# Patient Record
Sex: MALE | Race: WHITE | NOT HISPANIC OR LATINO | URBAN - METROPOLITAN AREA
[De-identification: names, ages, dates, MRNs, and addresses within clinical notes are randomized per-mention and may not be internally consistent; named-entity substitution may affect disease eponyms.]

---

## 2019-07-31 ENCOUNTER — TRANSFERRED RECORDS (OUTPATIENT)
Dept: HEALTH INFORMATION MANAGEMENT | Facility: CLINIC | Age: 59
End: 2019-07-31

## 2019-09-04 ENCOUNTER — OFFICE VISIT (OUTPATIENT)
Dept: OPHTHALMOLOGY | Facility: CLINIC | Age: 59
End: 2019-09-04
Attending: OPHTHALMOLOGY
Payer: COMMERCIAL

## 2019-09-04 DIAGNOSIS — H53.10 SUBJECTIVE VISUAL DISTURBANCE: Primary | ICD-10-CM

## 2019-09-04 DIAGNOSIS — H47.20 PARTIAL OPTIC ATROPHY: ICD-10-CM

## 2019-09-04 PROCEDURE — 92133 CPTRZD OPH DX IMG PST SGM ON: CPT | Mod: ZF | Performed by: OPHTHALMOLOGY

## 2019-09-04 PROCEDURE — 92083 EXTENDED VISUAL FIELD XM: CPT | Mod: ZF | Performed by: OPHTHALMOLOGY

## 2019-09-04 PROCEDURE — G0463 HOSPITAL OUTPT CLINIC VISIT: HCPCS | Mod: ZF | Performed by: TECHNICIAN/TECHNOLOGIST

## 2019-09-04 RX ORDER — MOMETASONE FUROATE MONOHYDRATE 50 UG/1
2 SPRAY, METERED NASAL PRN
COMMUNITY

## 2019-09-04 ASSESSMENT — REFRACTION_WEARINGRX
OD_ADD: +2.50
OD_CYLINDER: SPHERE
OD_SPHERE: +0.25
OS_CYLINDER: +1.25
OS_AXIS: 065
OS_ADD: +2.50
OS_SPHERE: -1.25
SPECS_TYPE: PAL

## 2019-09-04 ASSESSMENT — EXTERNAL EXAM - LEFT EYE: OS_EXAM: NORMAL

## 2019-09-04 ASSESSMENT — CUP TO DISC RATIO
OD_RATIO: 0.0
OS_RATIO: 0.0

## 2019-09-04 ASSESSMENT — TONOMETRY
IOP_METHOD: ICARE
OD_IOP_MMHG: 07
OS_IOP_MMHG: 08

## 2019-09-04 ASSESSMENT — VISUAL ACUITY
CORRECTION_TYPE: GLASSES
METHOD: SNELLEN - LINEAR
OS_CC: 20/400
OD_CC: 20/20-1

## 2019-09-04 ASSESSMENT — EXTERNAL EXAM - RIGHT EYE: OD_EXAM: NORMAL

## 2019-09-04 ASSESSMENT — SLIT LAMP EXAM - LIDS
COMMENTS: NORMAL
COMMENTS: NORMAL

## 2019-09-04 ASSESSMENT — CONF VISUAL FIELD
OD_INFERIOR_NASAL_RESTRICTION: 3
METHOD: COUNTING FINGERS
OS_INFERIOR_NASAL_RESTRICTION: 3
OD_SUPERIOR_NASAL_RESTRICTION: 3

## 2019-09-04 NOTE — LETTER
"2019    RE: Bruno Britt  : 1960  MRN: [id number]    Dear Dr. Welch,    Thank you for referring your patient, Bruno Britt, to my neuro-ophthalmology clinic recently.  After a thorough neuro-ophthalmic history and examination, I came to the following conclusions:      1. 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  Will institute work-up including reviewing old MRI myself, obtaining new MRI (regardless of findings of old MRI given history of progression since last \"normal\" scan), labs for nutritional optic neuropathy and syphilis.  Will also check full field electroretinogram given his history of sensorineural hearing loss given that retinitis pigmentosa without pigment can present with hearing loss as part of Usher syndrome though he is on the older side to be presenting for this.  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  Follow-up with me in 2.5 months or sooner as needed.    2. Small optic nerve head drusen in the right eye- not the etiology for bilateral optic atrophy.  Incidental to the optic atrophy in this case as there would have to be extensive drusen burden to cause this degree of optic atrophy.    Bruno Britt is a pleasant 59 year old White male who presents to my neuro-ophthalmology clinic today     Patient feels like he has had a decline in his left eye progressive over the last 3 years.  In the right eye he also feels like more recently he has had some deterioration in vision.  At no point as he ever remember sudden acute onset of vision loss.  He denies kenton eye pain.  His left eye has always been poor compared to his right eye.    The patient underwent an MRI 3 years ago but he cannot recall exactly where this was performed.  He believes it was somewhere in [place].  We are " currently trying to track down his old MRI (addendum: found scan at [facility] radiology).  He was told that this was normal without any evidence for why his vision was blurry.      No history of head trauma.    Patient has difficulty with night vision.  Difficulty getting to bathroom.    Past medical history: patient has bad hay fever in certain seasons.  He takes no medications except as needed allergy medications.  Patient had bad eczema has a child.    Patient has longstanding hearing loss.  Had difficulty with school performance / learning.    Job: patient works on maintenance and snow removal.  Patient reports normal diet and no history of etoh abuse.  Bowel habits normal.  No constant paresthesias sometimes right leg goes numb when driving for hours (sitting on wallet).     Family history: Maternal great grandfather was blind but unclear why.  Brother has glaucoma managed with drops.     Note from 09/2017 audiology showed what appears to be mild bilateral sensorineural hearing loss.    Exam today shows 20/20 vision in the right eye with 11/11 Ishihara color plates.  He saw 20/400 in the left eye and no Ishihara color plates.  Slit lamp exam was unremarkable bilaterally.   Dilated fundus exam showed moderate diffuse pallor in the right eye with one surface drusen and in the left eye moderate to severe diffuse optic atrophy / pallor.  Cranial nerves V1-3, 7,8,9,11, and 12 were normal bilaterally.     OCT of the optic nerve head revealed diffuse severe retinal nerve fiber layer thinning in both eyes without a clear pattern of atrophy.    Octopus automated 30 degree visual field was unreliable in both eyes and showed nonspecific depression in the right eye and global depression in the left eye.    In conclusion we have bilateral significant optic atrophy without a clear etiology and gradual progressive vision loss.  I am certainly concerned for compressive optic neuropathy from a benign mass although he reportedly had  an unremarkable MRI 2-3 years ago (we are currently trying to obtain images).  Will check MRI brain and orbits with and without IV contrast regardless of old MRI given his progression in vision loss since prior MRI.  Will check labs for nutritional optic neuropathy and syphilis though I expect these will be normal.  Will check full field electroretinogram simply because of his hearing loss and a visual field pattern more indicative of constriction than central field loss (particularly in the right eye).  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  We can discuss the pros and cons of genetic testing in further detail at next visit.  Unfortunately there is no effective treatment for dominant optic atrophy.      Again, thank you for trusting me with the care of your patient.  For further exam details, please feel free to contact our office for additional records.  If you wish to contact me regarding this patient please email me at [email or web address] or give my clinic a call to arrange a phone conversation.    Sincerely,    Nilton Arboleda MD  , Neuro-Ophthalmology and Adult Strabismus Surgery  The [facility] D[facility] and [facility]il[facility] Chair in Neuro-Ophthalmology  Department of Ophthalmology and Visual Neurosciences  [facility]    DX: optic atrophy, hearing loss

## 2019-09-04 NOTE — PROGRESS NOTES
"   1. 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  Will institute work-up including reviewing old MRI myself, obtaining new MRI (regardless of findings of old MRI given history of progression since last \"normal\" scan), labs for nutritional optic neuropathy and syphilis.  Will also check full field electroretinogram given his history of sensorineural hearing loss given that retinitis pigmentosa without pigment can present with hearing loss as part of Usher syndrome though he is on the older side to be presenting for this.  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  Follow-up with me in 2.5 months or sooner as needed.    2. Small optic nerve head drusen in the right eye- not the etiology for bilateral optic atrophy.  Incidental to the optic atrophy in this case as there would have to be extensive drusen burden to cause this degree of optic atrophy.    Bruno Britt is a pleasant 59 year old White male who presents to my neuro-ophthalmology clinic today     Patient feels like he has had a decline in his left eye progressive over the last 3 years.  In the right eye he also feels like more recently he has had some deterioration in vision.  At no point as he ever remember sudden acute onset of vision loss.  He denies kenton eye pain.  His left eye has always been poor compared to his right eye.    The patient underwent an MRI 3 years ago but he cannot recall exactly where this was performed.  He believes it was somewhere in [place].  We are currently trying to track down his old MRI (addendum: found scan at [facility] radiology).  He was told that this was normal without any evidence for why his vision was blurry.      No history of head trauma.    Patient has difficulty with night vision.  Difficulty getting to bathroom.    Past medical " history: patient has bad hay fever in certain seasons.  He takes no medications except as needed allergy medications.  Patient had bad eczema has a child.    Patient has longstanding hearing loss.  Had difficulty with school performance / learning.    Job: patient works on maintenance and snow removal.  Patient reports normal diet and no history of etoh abuse.  Bowel habits normal.  No constant paresthesias sometimes right leg goes numb when driving for hours (sitting on wallet).     Family history: Maternal great grandfather was blind but unclear why.  Brother has glaucoma managed with drops.     Note from 09/2017 audiology showed what appears to be mild bilateral sensorineural hearing loss.    Exam today shows 20/20 vision in the right eye with 11/11 Ishihara color plates.  He saw 20/400 in the left eye and no Ishihara color plates.  Slit lamp exam was unremarkable bilaterally.   Dilated fundus exam showed moderate diffuse pallor in the right eye with one surface drusen and in the left eye moderate to severe diffuse optic atrophy / pallor.  Cranial nerves V1-3, 7,8,9,11, and 12 were normal bilaterally.     OCT of the optic nerve head revealed diffuse severe retinal nerve fiber layer thinning in both eyes without a clear pattern of atrophy.    Octopus automated 30 degree visual field was unreliable in both eyes and showed nonspecific depression in the right eye and global depression in the left eye.    In conclusion we have bilateral significant optic atrophy without a clear etiology and gradual progressive vision loss.  I am certainly concerned for compressive optic neuropathy from a benign mass although he reportedly had an unremarkable MRI 2-3 years ago (we are currently trying to obtain images).  Will check MRI brain and orbits with and without IV contrast regardless of old MRI given his progression in vision loss since prior MRI.  Will check labs for nutritional optic neuropathy and syphilis though I expect  these will be normal.  Will check full field electroretinogram simply because of his hearing loss and a visual field pattern more indicative of constriction than central field loss (particularly in the right eye).  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  We can discuss the pros and cons of genetic testing in further detail at next visit.  Unfortunately there is no effective treatment for dominant optic atrophy.         I spent a total of 60 minutes face to face with Bruno Onealmike during today's office visit.  Over 50% of this time was spent counseling the patient and/or coordinating care regarding his optic atrophy.    Complete documentation of historical and exam elements from today's encounter can be found in the full encounter summary report (not reduplicated in this progress note).  I personally obtained the chief complaint(s) and history of present illness.  I confirmed and edited as necessary the review of systems, past medical/surgical history, family history, social history, and examination findings as documented by others; and I examined the patient myself.  I personally reviewed the relevant tests, images, and reports as documented above.  I formulated and edited as necessary the assessment and plan and discussed the findings and management plan with the patient and family.  I personally reviewed the ophthalmic test(s) associated with this encounter, agree with the interpretation(s) as documented by the resident/fellow, and have edited the corresponding report(s) as necessary.     Nilton Arboleda MD

## 2019-09-05 ENCOUNTER — TELEPHONE (OUTPATIENT)
Dept: OPHTHALMOLOGY | Facility: CLINIC | Age: 59
End: 2019-09-05

## 2019-09-06 NOTE — TELEPHONE ENCOUNTER
great.  just got off phone with brother of patient and went through the relevance of the old records.    Everyone is on the same page .    On Thu, Sep 5, 2019 at 3:17 PM Sagar Bowens <[email or web address]> wrote:  This message was sent securely using Z[employer]       The 9- ffERG has been canceled in EPIC!     From: Nilton Arboleda [mailto:[email or web address]]   Sent: Thursday, September 05, 2019 3:12 PM  To: Maria C Ortega; Sagar Bowens  Subject: Fwd: Fw: A fax has arrived from remote ID ''.     Oops- left Sagar Loving off first email.  ---------- Forwarded message ---------  From: Nilton Arboleda <[email or web address]>  Date: Thu, Sep 5, 2019 at 3:11 PM  Subject: Re: Fw: A fax has arrived from remote ID ''.  To: Maria C Ortega <[email or web address]>     thank you!     ASHLIE- Sagar Loving and Meryl, this documentation helps me a lot with this patients vision loss.  The patient left A LOT of the relevant history out when he saw me.  I don't believe the ERG is required anymore.  Sagar Loving, I will call the patient's brother and patient and tell them that I will cancel.  Can you go ahead and cancel it formally?     -Nilton     On Thu, Sep 5, 2019 at 1:33 PM Maria C Ortega <[email or web address]> wrote:  This message was sent securely using [employer]        Records you wanted for this pt     Bruno Britt  Male, 59 yrs, 1960  MRN:   [id number]     sent for scanning     Maria C Ortega  Neuro-Ophthalmology Clinical Facilitator  [address], [place] [address]  Phone: [phone]  Fax: [phone]

## 2019-09-09 ENCOUNTER — HOSPITAL ENCOUNTER (OUTPATIENT)
Dept: MRI IMAGING | Facility: CLINIC | Age: 59
Discharge: HOME | End: 2019-09-09
Attending: OPHTHALMOLOGY | Admitting: OPHTHALMOLOGY
Payer: COMMERCIAL

## 2019-09-09 ENCOUNTER — HOSPITAL ENCOUNTER (OUTPATIENT)
Dept: MRI IMAGING | Facility: CLINIC | Age: 59
End: 2019-09-09
Attending: OPHTHALMOLOGY
Payer: COMMERCIAL

## 2019-09-09 DIAGNOSIS — H53.10 SUBJECTIVE VISUAL DISTURBANCE: ICD-10-CM

## 2019-09-09 PROCEDURE — A9585 GADOBUTROL INJECTION: HCPCS | Performed by: OPHTHALMOLOGY

## 2019-09-09 PROCEDURE — 25500064 ZZH RX 255 OP 636: Performed by: OPHTHALMOLOGY

## 2019-09-09 PROCEDURE — 70553 MRI BRAIN STEM W/O & W/DYE: CPT

## 2019-09-09 PROCEDURE — 70543 MRI ORBT/FAC/NCK W/O &W/DYE: CPT

## 2019-09-09 RX ORDER — GADOBUTROL 604.72 MG/ML
8 INJECTION INTRAVENOUS ONCE
Status: COMPLETED | OUTPATIENT
Start: 2019-09-09 | End: 2019-09-09

## 2019-09-09 RX ADMIN — GADOBUTROL 8 ML: 604.72 INJECTION INTRAVENOUS at 14:18

## 2019-09-10 ENCOUNTER — MYC MEDICAL ADVICE (OUTPATIENT)
Dept: OTHER | Age: 59
End: 2019-09-10

## 2019-09-10 NOTE — TELEPHONE ENCOUNTER
09/10/2019 10:14 AM Phone (Outgoing) Bruno Britt (Self) [phone] (H) Remove   Left Message - Left message with MRI results         -em 10:15 AM September 10, 2019

## 2019-09-10 NOTE — TELEPHONE ENCOUNTER
Nilton Arboleda MD Mor[place], Eleanor             Please call patient and let him know that his MRI came back essentially normal without any evidence for a serious cause of vision loss. No tumors, no cancer, no inflammation.  This is a good thing.  Tell him we will discuss more at his follow-up visit with me.     Thank you. - Nilton

## 2019-10-04 ENCOUNTER — HEALTH MAINTENANCE LETTER (OUTPATIENT)
Age: 59
End: 2019-10-04

## 2019-11-13 ENCOUNTER — OFFICE VISIT (OUTPATIENT)
Dept: OPHTHALMOLOGY | Facility: CLINIC | Age: 59
End: 2019-11-13
Attending: OPHTHALMOLOGY
Payer: COMMERCIAL

## 2019-11-13 DIAGNOSIS — H47.20 PARTIAL OPTIC ATROPHY: ICD-10-CM

## 2019-11-13 DIAGNOSIS — H53.10 SUBJECTIVE VISUAL DISTURBANCE: Primary | ICD-10-CM

## 2019-11-13 PROCEDURE — 92133 CPTRZD OPH DX IMG PST SGM ON: CPT | Mod: ZF | Performed by: OPHTHALMOLOGY

## 2019-11-13 PROCEDURE — 92083 EXTENDED VISUAL FIELD XM: CPT | Mod: ZF | Performed by: OPHTHALMOLOGY

## 2019-11-13 PROCEDURE — G0463 HOSPITAL OUTPT CLINIC VISIT: HCPCS | Mod: ZF | Performed by: TECHNICIAN/TECHNOLOGIST

## 2019-11-13 ASSESSMENT — CUP TO DISC RATIO
OS_RATIO: 0.0
OD_RATIO: 0.0

## 2019-11-13 ASSESSMENT — CONF VISUAL FIELD
OS_INFERIOR_NASAL_RESTRICTION: 3
OS_INFERIOR_TEMPORAL_RESTRICTION: 3
OD_INFERIOR_NASAL_RESTRICTION: 1
METHOD: COUNTING FINGERS
OD_INFERIOR_TEMPORAL_RESTRICTION: 1

## 2019-11-13 ASSESSMENT — VISUAL ACUITY
METHOD: SNELLEN - LINEAR
CORRECTION_TYPE: GLASSES
OS_CC+: ECCE
OD_CC+: -2
OD_CC: 20/25
OS_CC: 20/400

## 2019-11-13 ASSESSMENT — TONOMETRY
OD_IOP_MMHG: 15
OS_IOP_MMHG: 13
IOP_METHOD: ICARE

## 2019-11-13 ASSESSMENT — REFRACTION_WEARINGRX
OD_SPHERE: +0.25
SPECS_TYPE: PAL
OS_AXIS: 065
OD_ADD: +2.50
OD_CYLINDER: SPHERE
OS_ADD: +2.50
OS_CYLINDER: +1.25
OS_SPHERE: -1.25

## 2019-11-13 ASSESSMENT — EXTERNAL EXAM - LEFT EYE: OS_EXAM: NORMAL

## 2019-11-13 ASSESSMENT — SLIT LAMP EXAM - LIDS
COMMENTS: NORMAL
COMMENTS: NORMAL

## 2019-11-13 ASSESSMENT — EXTERNAL EXAM - RIGHT EYE: OD_EXAM: NORMAL

## 2019-11-13 NOTE — LETTER
2019    RE: Bruno Britt  : 1960  MRN: [id number]    Dear Providers,    I saw our mutual patient, Bruno Britt, in follow-up in my clinic recently.  After a thorough neuro-ophthalmic history and examination, I came to the following conclusions:     1. 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  Reviewed outside records documenting sequential vision loss with associated optic nerve head swelling / hemorrhage indicative of non-arteritic anterior ischemic optic neuropathy.  visual acuity from 2016 essentially stable.  Repeat MRI brain and orbits with and without IV contrast was unremarkable aside from optic atrophy.    2. Optic nerve head drusen in the right eye- may have contributed to risk of non-arteritic anterior ischemic optic neuropathy.    3. Driving status- will check DMV visual field today to ensure that patient meets minimum legal driving criteria.    - For a more thorough description of the disease presentation, please see my letter from the patient's initial visit with me    Visual function and exam stable today.  Reviewed in detail with patient and brother the notes from Dr. Rojas in 2016 and .    Check dmv visual field today.     On the road professional driving eval if patient passes minimum visual field requirement in [place].  The driving eval important to ensure patient safe on the road.    Best contact #    Rajinder- [phone]    Hilton- [phone]    Addendum:  Patient had a total horizontal field (combined in both eyes) of 125 degrees on DMV protocol testing. Will recommend no driving until formal driving evaluation.        For further exam details, please feel free to contact our office for additional records.  If you wish to contact me regarding this patient please email me at [email or web address] or give my clinic a call to arrange a phone conversation.    Sincerely,    Nilton Arboleda MD  ,  Neuro-Ophthalmology and Adult Strabismus Surgery  The [facility] Chair in Neuro-Ophthalmology  Department of Ophthalmology and Visual Neurosciences  [facility]

## 2019-11-13 NOTE — PROGRESS NOTES
1. 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  Reviewed outside records documenting sequential vision loss with associated optic nerve head swelling / hemorrhage indicative of non-arteritic anterior ischemic optic neuropathy.  visual acuity from 2016 essentially stable.  Repeat MRI brain and orbits with and without IV contrast was unremarkable aside from optic atrophy.    2. Optic nerve head drusen in the right eye- may have contributed to risk of non-arteritic anterior ischemic optic neuropathy.    3. Driving status- will check DMV visual field today to ensure that patient meets minimum legal driving criteria.    - For a more thorough description of the disease presentation, please see my letter from the patient's initial visit with me    Visual function and exam stable today.  Reviewed in detail with patient and brother the notes from Dr. Rojas in 2016 and 2015.    Check dmv visual field today.     On the road professional driving eval if patient passes minimum visual field requirement in [place].  The driving eval important to ensure patient safe on the road.    Best contact #    Rajinder- [phone]    Hilton- [phone]    Addendum:  Patient had a total horizontal field (combined in both eyes) of 125 degrees on DMV protocol testing. Will recommend no driving until formal driving evaluation.         Complete documentation of historical and exam elements from today's encounter can be found in the full encounter summary report (not reduplicated in this progress note).  I personally obtained the chief complaint(s) and history of present illness.  I confirmed and edited as necessary the review of systems, past medical/surgical history, family history, social history, and examination findings as documented by others; and I examined the patient myself.  I personally reviewed the relevant tests, images, and reports as documented above.  I formulated  and edited as necessary the assessment and plan and discussed the findings and management plan with the patient and family     Nilton Arboleda MD

## 2019-11-19 ENCOUNTER — TELEPHONE (OUTPATIENT)
Dept: OPHTHALMOLOGY | Facility: CLINIC | Age: 59
End: 2019-11-19

## 2019-11-20 NOTE — TELEPHONE ENCOUNTER
Bruno Britt's brother Hilton Britt left a voicemail asking for a call with the results of the visual field test that Bruno took.  Please call [phone] with those results as soon as possible.

## 2020-11-14 ENCOUNTER — HEALTH MAINTENANCE LETTER (OUTPATIENT)
Age: 60
End: 2020-11-14

## 2021-09-12 ENCOUNTER — HEALTH MAINTENANCE LETTER (OUTPATIENT)
Age: 61
End: 2021-09-12

## 2022-01-02 ENCOUNTER — HEALTH MAINTENANCE LETTER (OUTPATIENT)
Age: 62
End: 2022-01-02

## 2022-10-30 ENCOUNTER — HEALTH MAINTENANCE LETTER (OUTPATIENT)
Age: 62
End: 2022-10-30

## 2023-04-08 ENCOUNTER — HEALTH MAINTENANCE LETTER (OUTPATIENT)
Age: 63
End: 2023-04-08